# Patient Record
Sex: FEMALE | Race: WHITE | NOT HISPANIC OR LATINO | Employment: FULL TIME | URBAN - METROPOLITAN AREA
[De-identification: names, ages, dates, MRNs, and addresses within clinical notes are randomized per-mention and may not be internally consistent; named-entity substitution may affect disease eponyms.]

---

## 2017-06-09 ENCOUNTER — BCC/SCC (OUTPATIENT)
Dept: URBAN - METROPOLITAN AREA CLINIC 31 | Facility: CLINIC | Age: 62
Setting detail: DERMATOLOGY
End: 2017-06-09

## 2017-06-09 PROCEDURE — 11301 SHAVE SKIN LESION 0.6-1.0 CM: CPT

## 2017-06-09 PROCEDURE — 99214 OFFICE O/P EST MOD 30 MIN: CPT

## 2019-07-03 ENCOUNTER — SKIN CANCER EXAM (OUTPATIENT)
Dept: URBAN - METROPOLITAN AREA CLINIC 31 | Facility: CLINIC | Age: 64
Setting detail: DERMATOLOGY
End: 2019-07-03

## 2019-07-03 DIAGNOSIS — Z09 ENCOUNTER FOR FOLLOW-UP EXAMINATION AFTER COMPLETED TREATMENT FOR CONDITIONS OTHER THAN MALIGNANT NEOPLASM: ICD-10-CM

## 2019-07-03 PROBLEM — L82.1 OTHER SEBORRHEIC KERATOSIS: Status: ACTIVE | Noted: 2019-07-03

## 2019-07-03 PROBLEM — L90.5 SCAR CONDITIONS AND FIBROSIS OF SKIN: Status: RESOLVED | Noted: 2019-07-03

## 2019-07-03 PROBLEM — L57.0 ACTINIC KERATOSIS: Status: RESOLVED | Noted: 2019-07-03

## 2019-07-03 PROBLEM — L82.1 OTHER SEBORRHEIC KERATOSIS: Status: RESOLVED | Noted: 2019-07-03

## 2019-07-03 PROBLEM — L90.5 SCAR CONDITIONS AND FIBROSIS OF SKIN: Status: ACTIVE | Noted: 2019-07-03

## 2019-07-03 PROCEDURE — 17000 DESTRUCT PREMALG LESION: CPT

## 2019-07-03 PROCEDURE — 99214 OFFICE O/P EST MOD 30 MIN: CPT

## 2020-11-25 ENCOUNTER — RX ONLY (RX ONLY)
Age: 65
End: 2020-11-25

## 2020-11-25 ENCOUNTER — SKIN CANCER EXAM (OUTPATIENT)
Dept: URBAN - METROPOLITAN AREA CLINIC 31 | Facility: CLINIC | Age: 65
Setting detail: DERMATOLOGY
End: 2020-11-25

## 2020-11-25 DIAGNOSIS — Z41.9 ENCOUNTER FOR PROCEDURE FOR PURPOSES OTHER THAN REMEDYING HEALTH STATE, UNSPECIFIED: ICD-10-CM

## 2020-11-25 PROBLEM — L57.0 ACTINIC KERATOSIS: Status: RESOLVED | Noted: 2020-11-25

## 2020-11-25 PROBLEM — R21 RASH AND OTHER NONSPECIFIC SKIN ERUPTION: Status: ACTIVE | Noted: 2020-11-25

## 2020-11-25 PROBLEM — R21 RASH AND OTHER NONSPECIFIC SKIN ERUPTION: Status: RESOLVED | Noted: 2020-11-25

## 2020-11-25 PROBLEM — D18.01 HEMANGIOMA OF SKIN AND SUBCUTANEOUS TISSUE: Status: RESOLVED | Noted: 2020-11-25

## 2020-11-25 PROBLEM — D18.01 HEMANGIOMA OF SKIN AND SUBCUTANEOUS TISSUE: Status: ACTIVE | Noted: 2020-11-25

## 2020-11-25 PROCEDURE — 17003 DESTRUCT PREMALG LES 2-14: CPT

## 2020-11-25 PROCEDURE — 99214 OFFICE O/P EST MOD 30 MIN: CPT

## 2020-11-25 PROCEDURE — 17000 DESTRUCT PREMALG LESION: CPT

## 2021-04-06 ENCOUNTER — OFFICE VISIT (OUTPATIENT)
Dept: URBAN - METROPOLITAN AREA CLINIC 96 | Facility: CLINIC | Age: 66
End: 2021-04-06

## 2021-06-15 ENCOUNTER — OFFICE VISIT (OUTPATIENT)
Dept: URBAN - METROPOLITAN AREA CLINIC 96 | Facility: CLINIC | Age: 66
End: 2021-06-15

## 2021-06-16 ENCOUNTER — OFFICE VISIT (OUTPATIENT)
Dept: URBAN - METROPOLITAN AREA CLINIC 96 | Facility: CLINIC | Age: 66
End: 2021-06-16
Payer: COMMERCIAL

## 2021-06-16 ENCOUNTER — WEB ENCOUNTER (OUTPATIENT)
Dept: URBAN - METROPOLITAN AREA CLINIC 96 | Facility: CLINIC | Age: 66
End: 2021-06-16

## 2021-06-16 ENCOUNTER — LAB OUTSIDE AN ENCOUNTER (OUTPATIENT)
Dept: URBAN - METROPOLITAN AREA CLINIC 96 | Facility: CLINIC | Age: 66
End: 2021-06-16

## 2021-06-16 DIAGNOSIS — K76.89 LIVER CYST: ICD-10-CM

## 2021-06-16 DIAGNOSIS — Z86.010 PERSONAL HISTORY OF COLONIC POLYPS: ICD-10-CM

## 2021-06-16 DIAGNOSIS — K21.9 GASTROESOPHAGEAL REFLUX DISEASE, UNSPECIFIED WHETHER ESOPHAGITIS PRESENT: ICD-10-CM

## 2021-06-16 PROCEDURE — 99204 OFFICE O/P NEW MOD 45 MIN: CPT | Performed by: INTERNAL MEDICINE

## 2021-06-16 RX ORDER — DEXLANSOPRAZOLE 60 MG/1
1 CAPSULE CAPSULE, DELAYED RELEASE ORAL ONCE A DAY
Qty: 30 | Refills: 6 | OUTPATIENT
Start: 2021-06-16

## 2021-06-16 NOTE — HPI-TODAY'S VISIT:
64 yo female referred by Dr Kimi Valero for a GI consultation for GERD and a copy of this note will be sent to the referring physician. Patient had not ultrasound done by her primary care physician which revealed multiple liver lesions which were indeterminate but appeared to look like cysts.  A contrast-enhanced MRI was recommended for further evaluation and this was done for epigastric discomfort.  The ultrasound was done by Dr. Jono Sneed on August 26, 2020.  Patient had a colonoscopy done by Dr. Fani Blandon on September 11, 2020 this revealed a rectal polyp that was hyperplastic and removed.  MRI done on September 21, 2020 by Dr. Sneed revealed multiple benign meningiomas and cyst within the liver no other acute abnormality.  Patient had a gastric emptying scan done on December 3, 2020 and this revealed normal gastric emptying.  Patient had a HIDA scan done on December 8, 2020 and this was normal.  EGD done on September 8, 2020 by Dr. Jono Sneed revealed LA grade a esophagitis and a small hiatal hernia.  She also had some gastritis.  Biopsies are not available from that exam. Pt started with omeprazole 40mg 1 a day. Pt saw an ENT due to throat burning and metallic taste in mouth. Pt was Pt was increased to omeprazole BID. Pt was gassy with meds but also is gassy in general. Pt was changed to pepcid qhs only. Pt had in Sept both nausea, with chest discomfort and heartburn. Pt saws Dr De La Cruz and meds changed to pantoprazole in the am and she did this through last fall- this helped heartburn and nausea some but not resolved. Pt had in Nov- bad days and so she was increased to pantoprazole BID. Pt had a f/u in March and she was having good and bad days. Pt stayed on PPI BID until May and she was having mucus and gas so she stopped PPIs. Pt is now off the meds. Pt has not wanted to stay on meds.

## 2021-07-15 ENCOUNTER — WEB ENCOUNTER (OUTPATIENT)
Dept: URBAN - METROPOLITAN AREA CLINIC 96 | Facility: CLINIC | Age: 66
End: 2021-07-15

## 2021-07-16 ENCOUNTER — WEB ENCOUNTER (OUTPATIENT)
Dept: URBAN - METROPOLITAN AREA CLINIC 96 | Facility: CLINIC | Age: 66
End: 2021-07-16

## 2021-07-19 ENCOUNTER — WEB ENCOUNTER (OUTPATIENT)
Dept: URBAN - METROPOLITAN AREA CLINIC 96 | Facility: CLINIC | Age: 66
End: 2021-07-19

## 2021-07-20 ENCOUNTER — TELEPHONE ENCOUNTER (OUTPATIENT)
Dept: URBAN - METROPOLITAN AREA CLINIC 92 | Facility: CLINIC | Age: 66
End: 2021-07-20

## 2021-07-25 ENCOUNTER — WEB ENCOUNTER (OUTPATIENT)
Dept: URBAN - METROPOLITAN AREA CLINIC 96 | Facility: CLINIC | Age: 66
End: 2021-07-25

## 2021-08-05 ENCOUNTER — TELEPHONE ENCOUNTER (OUTPATIENT)
Dept: URBAN - METROPOLITAN AREA CLINIC 96 | Facility: CLINIC | Age: 66
End: 2021-08-05

## 2021-08-09 ENCOUNTER — ERX REFILL RESPONSE (OUTPATIENT)
Dept: URBAN - METROPOLITAN AREA CLINIC 96 | Facility: CLINIC | Age: 66
End: 2021-08-09

## 2021-08-09 RX ORDER — DEXLANSOPRAZOLE 60 MG/1
1 CAPSULE CAPSULE, DELAYED RELEASE ORAL ONCE A DAY
Qty: 30 | Refills: 6 | OUTPATIENT

## 2021-08-09 RX ORDER — DEXLANSOPRAZOLE 60 MG/1
TAKE 1 CAPSULE BY MOUTH EVERY DAY CAPSULE, DELAYED RELEASE ORAL
Qty: 90 CAPSULE | Refills: 3 | OUTPATIENT

## 2021-08-27 ENCOUNTER — WEB ENCOUNTER (OUTPATIENT)
Dept: URBAN - METROPOLITAN AREA CLINIC 96 | Facility: CLINIC | Age: 66
End: 2021-08-27

## 2021-09-03 PROBLEM — 235595009 GASTROESOPHAGEAL REFLUX DISEASE: Status: ACTIVE | Noted: 2021-09-03

## 2021-09-16 ENCOUNTER — CLAIMS CREATED FROM THE CLAIM WINDOW (OUTPATIENT)
Dept: URBAN - METROPOLITAN AREA CLINIC 4 | Facility: CLINIC | Age: 66
End: 2021-09-16
Payer: COMMERCIAL

## 2021-09-16 ENCOUNTER — OFFICE VISIT (OUTPATIENT)
Dept: URBAN - METROPOLITAN AREA SURGERY CENTER 16 | Facility: SURGERY CENTER | Age: 66
End: 2021-09-16
Payer: COMMERCIAL

## 2021-09-16 DIAGNOSIS — K21.9 ACID REFLUX: ICD-10-CM

## 2021-09-16 DIAGNOSIS — K31.89 ACQUIRED DEFORMITY OF DUODENUM: ICD-10-CM

## 2021-09-16 DIAGNOSIS — K21.9 GASTRO-ESOPHAGEAL REFLUX DISEASE WITHOUT ESOPHAGITIS: ICD-10-CM

## 2021-09-16 DIAGNOSIS — K31.89 NONSURGICAL DUMPING SYNDROME: ICD-10-CM

## 2021-09-16 PROCEDURE — 43239 EGD BIOPSY SINGLE/MULTIPLE: CPT | Performed by: INTERNAL MEDICINE

## 2021-09-16 PROCEDURE — G8907 PT DOC NO EVENTS ON DISCHARG: HCPCS | Performed by: INTERNAL MEDICINE

## 2021-09-16 PROCEDURE — 88312 SPECIAL STAINS GROUP 1: CPT | Performed by: PATHOLOGY

## 2021-09-16 PROCEDURE — 88305 TISSUE EXAM BY PATHOLOGIST: CPT | Performed by: PATHOLOGY

## 2021-09-20 ENCOUNTER — OFFICE VISIT (OUTPATIENT)
Dept: URBAN - METROPOLITAN AREA CLINIC 92 | Facility: CLINIC | Age: 66
End: 2021-09-20

## 2021-09-20 RX ORDER — DEXLANSOPRAZOLE 60 MG/1
TAKE 1 CAPSULE BY MOUTH EVERY DAY CAPSULE, DELAYED RELEASE ORAL
Qty: 90 CAPSULE | Refills: 3 | Status: ACTIVE | COMMUNITY

## 2021-09-30 ENCOUNTER — WEB ENCOUNTER (OUTPATIENT)
Dept: URBAN - METROPOLITAN AREA CLINIC 96 | Facility: CLINIC | Age: 66
End: 2021-09-30

## 2021-10-04 ENCOUNTER — WEB ENCOUNTER (OUTPATIENT)
Dept: URBAN - METROPOLITAN AREA CLINIC 96 | Facility: CLINIC | Age: 66
End: 2021-10-04

## 2021-10-06 ENCOUNTER — OFFICE VISIT (OUTPATIENT)
Dept: URBAN - METROPOLITAN AREA TELEHEALTH 2 | Facility: TELEHEALTH | Age: 66
End: 2021-10-06
Payer: COMMERCIAL

## 2021-10-06 DIAGNOSIS — K21.9 GASTROESOPHAGEAL REFLUX DISEASE, UNSPECIFIED WHETHER ESOPHAGITIS PRESENT: ICD-10-CM

## 2021-10-06 DIAGNOSIS — K76.89 LIVER CYST: ICD-10-CM

## 2021-10-06 DIAGNOSIS — D18.03 LIVER HEMANGIOMA: ICD-10-CM

## 2021-10-06 DIAGNOSIS — R74.8 ELEVATED ALKALINE PHOSPHATASE LEVEL: ICD-10-CM

## 2021-10-06 DIAGNOSIS — Z86.010 PERSONAL HISTORY OF COLONIC POLYPS: ICD-10-CM

## 2021-10-06 PROBLEM — 235595009 GASTROESOPHAGEAL REFLUX DISEASE: Status: ACTIVE | Noted: 2021-10-05

## 2021-10-06 PROBLEM — 428283002: Status: ACTIVE | Noted: 2021-06-16

## 2021-10-06 PROBLEM — 85057007: Status: ACTIVE | Noted: 2021-06-16

## 2021-10-06 PROCEDURE — 99205 OFFICE O/P NEW HI 60 MIN: CPT | Performed by: INTERNAL MEDICINE

## 2021-10-06 RX ORDER — DEXLANSOPRAZOLE 60 MG/1
TAKE 1 CAPSULE BY MOUTH EVERY DAY CAPSULE, DELAYED RELEASE ORAL
Qty: 90 CAPSULE | Refills: 3 | Status: ACTIVE | COMMUNITY

## 2021-10-06 RX ORDER — FAMOTIDINE 20 MG/1
1 TABLET AT BEDTIME AS NEEDED TABLET, FILM COATED ORAL ONCE A DAY
Status: ACTIVE | COMMUNITY

## 2021-10-06 RX ORDER — DEXLANSOPRAZOLE 60 MG/1
1 CAPSULE CAPSULE, DELAYED RELEASE ORAL ONCE A DAY
Qty: 30 | Refills: 6 | OUTPATIENT

## 2021-10-06 NOTE — HPI-TODAY'S VISIT:
66 yo female referred by Dr Kimi Vlaero for a GI fu for GERD and stomach issues and a copy of this note will be sent to the referring physician. Patient had not ultrasound done by her primary care physician which revealed multiple liver lesions which were indeterminate but appeared to look like cysts.  A contrast-enhanced MRI was recommended for further evaluation and this was done for epigastric discomfort.  The ultrasound was done by Dr. Jono Sneed on August 26, 2020.  Patient had a colonoscopy done by Dr. Fani Rangel on September 11, 2020 this revealed a rectal polyp that was hyperplastic and removed.  MRI done on September 21, 2020 by Dr. Sneed revealed multiple benign hemangiomas and cyst within the liver no other acute abnormality.  Patient had a gastric emptying scan done on December 3, 2020 and this revealed normal gastric emptying.  Patient had a HIDA scan done on December 8, 2020 and this was normal.  EGD done on September 8, 2020 by Dr. oJno Sneed revealed LA grade a esophagitis and a small hiatal hernia.  She also had some gastritis.  Biopsies are not available from that exam. Pt started with omeprazole 40mg 1 a day. Pt saw an ENT due to throat burning and metallic taste in mouth. Pt was Pt was increased to omeprazole BID. Pt was gassy with meds but also is gassy in general. Pt was changed to pepcid qhs only. Pt had in Sept both nausea, with chest discomfort and heartburn. Pt saws Dr De La Cruz and meds changed to pantoprazole in the am and she did this through last fall- this helped heartburn and nausea some but not resolved. Pt had in Nov- bad days and so she was increased to pantoprazole BID. Pt had a f/u in March and she was having good and bad days. Pt stayed on PPI BID until May and she was having mucus and gas so she stopped PPIs. Patient had an EGD B[facility] on September 16, 2021 on PPI to assess her atypical symptoms of throat clearing and metallic taste in the mouth despite Dexilant.  The exam was fairly normal.  Bravo study showed no acid breakthrough while on PPI.  Biopsies also were fairly unrevealing. Biopsies with reflux and reactive gastropathy no H pylori. Patient has emailed me a few times complaining of persistent nausea as well as some reflux symptoms that come and go despite medications.  She is a bit frustrated that she has ongoing symptoms despite a negative work-up to date. Pt was not on pepcid during the bravo. Pt c/o essentially saliva in mouth in am and throat burning despite dexilant and pepcid. Pt also c/o stomach fullness and burning sometimes on epigastrium. Pt just started FDgard. Pt had bloodwork with alk phosphatase is 123 so 2 points above normal. Pt admits to lots of stress this year, does exercise. Pt does  for a [employer] company. Thinking about retiring at some point soon.

## 2021-10-14 ENCOUNTER — WEB ENCOUNTER (OUTPATIENT)
Dept: URBAN - METROPOLITAN AREA CLINIC 96 | Facility: CLINIC | Age: 66
End: 2021-10-14

## 2021-10-14 ENCOUNTER — TELEPHONE ENCOUNTER (OUTPATIENT)
Dept: URBAN - METROPOLITAN AREA CLINIC 92 | Facility: CLINIC | Age: 66
End: 2021-10-14

## 2021-10-29 ENCOUNTER — FACIAL- NEW (OUTPATIENT)
Dept: URBAN - METROPOLITAN AREA CLINIC 30 | Facility: CLINIC | Age: 66
Setting detail: DERMATOLOGY
End: 2021-10-29

## 2021-10-29 DIAGNOSIS — L57.0 ACTINIC KERATOSIS: ICD-10-CM

## 2021-10-29 PROCEDURE — OTHER FACIAL: OTHER

## 2021-11-12 ENCOUNTER — WEB ENCOUNTER (OUTPATIENT)
Dept: URBAN - METROPOLITAN AREA CLINIC 96 | Facility: CLINIC | Age: 66
End: 2021-11-12

## 2021-11-16 ENCOUNTER — WEB ENCOUNTER (OUTPATIENT)
Dept: URBAN - METROPOLITAN AREA CLINIC 96 | Facility: CLINIC | Age: 66
End: 2021-11-16

## 2021-11-16 ENCOUNTER — TELEPHONE ENCOUNTER (OUTPATIENT)
Dept: URBAN - METROPOLITAN AREA CLINIC 92 | Facility: CLINIC | Age: 66
End: 2021-11-16

## 2021-11-23 ENCOUNTER — WEB ENCOUNTER (OUTPATIENT)
Dept: URBAN - METROPOLITAN AREA CLINIC 96 | Facility: CLINIC | Age: 66
End: 2021-11-23

## 2021-12-04 ENCOUNTER — DASHBOARD ENCOUNTERS (OUTPATIENT)
Age: 66
End: 2021-12-04

## 2021-12-06 ENCOUNTER — OFFICE VISIT (OUTPATIENT)
Dept: URBAN - METROPOLITAN AREA TELEHEALTH 2 | Facility: TELEHEALTH | Age: 66
End: 2021-12-06
Payer: COMMERCIAL

## 2021-12-06 ENCOUNTER — TELEPHONE ENCOUNTER (OUTPATIENT)
Dept: URBAN - METROPOLITAN AREA CLINIC 98 | Facility: CLINIC | Age: 66
End: 2021-12-06

## 2021-12-06 DIAGNOSIS — K76.9 LIVER LESION: ICD-10-CM

## 2021-12-06 DIAGNOSIS — R74.8 ELEVATED ALKALINE PHOSPHATASE LEVEL: ICD-10-CM

## 2021-12-06 PROBLEM — 300331000: Status: ACTIVE | Noted: 2021-12-04

## 2021-12-06 PROCEDURE — 99214 OFFICE O/P EST MOD 30 MIN: CPT | Performed by: INTERNAL MEDICINE

## 2021-12-06 RX ORDER — DEXLANSOPRAZOLE 60 MG/1
1 CAPSULE CAPSULE, DELAYED RELEASE ORAL ONCE A DAY
Qty: 30 | Refills: 6 | Status: ACTIVE | COMMUNITY

## 2021-12-06 RX ORDER — FAMOTIDINE 20 MG/1
1 TABLET AT BEDTIME AS NEEDED TABLET, FILM COATED ORAL ONCE A DAY
Status: ACTIVE | COMMUNITY

## 2021-12-06 RX ORDER — DEXLANSOPRAZOLE 60 MG/1
TAKE 1 CAPSULE BY MOUTH EVERY DAY CAPSULE, DELAYED RELEASE ORAL
Qty: 90 CAPSULE | Refills: 3 | Status: ACTIVE | COMMUNITY

## 2021-12-06 NOTE — HPI-TODAY'S VISIT:
Here on kind referral from Dr Romero Florez for elevated liver testing and abnormal liver imaging.   Had reflux at first, then July 2020 - went for GB US for reflux symptoms.  had MRI:  [place] [facility] 2020 : R lobe hemangioma largest 3.4 cm; segment 6 hemangioma 4cm, 1.1 cm hemangioma seg 4a, 0.6cm hemangioma in seg 3; multiple hepatic cysts largest 1.8cm.  non cirrhotic liver morphology, patent hepatic veins, no splenomegaly This year, did have high ALP on physical.   labs 11/11 : cr 0.61 na 133 alb 4.3 tbili 0.2 alp 127 ast 28 alt 21 ggt 15 no nausea, but mouth burns.  Dexilant works well.

## 2021-12-11 LAB
A/G RATIO: 1.5
AFP, SERUM, TUMOR MARKER: 2.9
ALBUMIN: 4.1
ALKALINE PHOSPHATASE: 142
ALT (SGPT): 20
AST (SGOT): 26
BASO (ABSOLUTE): 0
BASOS: 1
BILIRUBIN, TOTAL: <0.2
BONE FRACTION:: 32
BUN/CREATININE RATIO: 13
BUN: 7
CALCIUM: 9.2
CARBON DIOXIDE, TOTAL: 25
CHLORIDE: 99
CREATININE: 0.55
EGFR IF AFRICN AM: 115
EGFR IF NONAFRICN AM: 99
EOS (ABSOLUTE): 0.1
EOS: 1
GGT: 15
GLOBULIN, TOTAL: 2.8
GLUCOSE: 115
HBSAG SCREEN: NEGATIVE
HCV AB: 0.4
HEMATOCRIT: 41.6
HEMATOLOGY COMMENTS:: (no result)
HEMOGLOBIN: 13.9
HEP A AB, TOTAL: NEGATIVE
HEP B CORE AB, TOT: NEGATIVE
HEPATITIS B SURF AB QUANT: <3.1
IMMATURE CELLS: (no result)
IMMATURE GRANS (ABS): 0
IMMATURE GRANULOCYTES: 0
IMMUNOGLOBULIN A, QN, SERUM: 368
IMMUNOGLOBULIN G, QN, SERUM: 1093
IMMUNOGLOBULIN M, QN, SERUM: 88
INTERPRETATION:: (no result)
INTESTINAL FRAC.:: 2
LIVER FRACTION:: 66
LYMPHS (ABSOLUTE): 1.6
LYMPHS: 24
MCH: 29.2
MCHC: 33.4
MCV: 87
MITOCHONDRIAL (M2) ANTIBODY: <20
MONOCYTES(ABSOLUTE): 0.6
MONOCYTES: 8
NEUTROPHILS (ABSOLUTE): 4.3
NEUTROPHILS: 66
NRBC: (no result)
PLATELETS: 328
POTASSIUM: 4.2
PROTEIN, TOTAL: 6.9
RBC: 4.76
RDW: 12
SODIUM: 137
WBC: 6.6

## 2021-12-13 ENCOUNTER — TELEPHONE ENCOUNTER (OUTPATIENT)
Dept: URBAN - METROPOLITAN AREA CLINIC 98 | Facility: CLINIC | Age: 66
End: 2021-12-13

## 2021-12-13 ENCOUNTER — WEB ENCOUNTER (OUTPATIENT)
Dept: URBAN - METROPOLITAN AREA CLINIC 86 | Facility: CLINIC | Age: 66
End: 2021-12-13

## 2021-12-27 ENCOUNTER — TELEPHONE ENCOUNTER (OUTPATIENT)
Dept: URBAN - METROPOLITAN AREA CLINIC 98 | Facility: CLINIC | Age: 66
End: 2021-12-27

## 2021-12-29 ENCOUNTER — WEB ENCOUNTER (OUTPATIENT)
Dept: URBAN - METROPOLITAN AREA CLINIC 86 | Facility: CLINIC | Age: 66
End: 2021-12-29

## 2021-12-30 ENCOUNTER — WEB ENCOUNTER (OUTPATIENT)
Dept: URBAN - METROPOLITAN AREA CLINIC 86 | Facility: CLINIC | Age: 66
End: 2021-12-30

## 2022-01-10 ENCOUNTER — WEB ENCOUNTER (OUTPATIENT)
Dept: URBAN - METROPOLITAN AREA CLINIC 96 | Facility: CLINIC | Age: 67
End: 2022-01-10

## 2022-01-10 RX ORDER — DEXLANSOPRAZOLE 60 MG/1
1 CAPSULE CAPSULE, DELAYED RELEASE ORAL ONCE A DAY
Qty: 90 CAPSULE | Refills: 3 | OUTPATIENT
Start: 2022-01-11

## 2022-01-19 ENCOUNTER — ERX REFILL RESPONSE (OUTPATIENT)
Dept: URBAN - METROPOLITAN AREA CLINIC 96 | Facility: CLINIC | Age: 67
End: 2022-01-19

## 2022-01-19 RX ORDER — DEXLANSOPRAZOLE 60 MG/1
TAKE 1 CAPSULE BY MOUTH EVERY DAY CAPSULE, DELAYED RELEASE ORAL
Qty: 90 CAPSULE | Refills: 3 | OUTPATIENT

## 2022-01-19 RX ORDER — DEXLANSOPRAZOLE 60 MG/1
TAKE 1 CAPSULE BY MOUTH EVERY DAY CAPSULE, DELAYED RELEASE ORAL
Qty: 90 CAPSULE | Refills: 4 | OUTPATIENT

## 2022-03-16 ENCOUNTER — WEB ENCOUNTER (OUTPATIENT)
Dept: URBAN - METROPOLITAN AREA CLINIC 96 | Facility: CLINIC | Age: 67
End: 2022-03-16

## 2022-05-13 ENCOUNTER — WEB ENCOUNTER (OUTPATIENT)
Dept: URBAN - METROPOLITAN AREA CLINIC 96 | Facility: CLINIC | Age: 67
End: 2022-05-13

## 2022-08-22 ENCOUNTER — APPOINTMENT (OUTPATIENT)
Dept: URBAN - METROPOLITAN AREA CLINIC 207 | Age: 67
Setting detail: DERMATOLOGY
End: 2022-08-22

## 2022-08-22 DIAGNOSIS — L65.9 NONSCARRING HAIR LOSS, UNSPECIFIED: ICD-10-CM

## 2022-08-22 DIAGNOSIS — L60.0 INGROWING NAIL: ICD-10-CM

## 2022-08-22 DIAGNOSIS — L58.9 RADIODERMATITIS, UNSPECIFIED: ICD-10-CM

## 2022-08-22 PROCEDURE — OTHER COUNSELING: OTHER

## 2022-08-22 PROCEDURE — 99214 OFFICE O/P EST MOD 30 MIN: CPT

## 2022-08-22 PROCEDURE — OTHER PRESCRIPTION MEDICATION MANAGEMENT: OTHER

## 2022-08-22 PROCEDURE — OTHER PRESCRIPTION: OTHER

## 2022-08-22 RX ORDER — HYDROCORTISONE 25 MG/G
CREAM TOPICAL
Qty: 30 | Refills: 0 | Status: ERX | COMMUNITY
Start: 2022-08-22

## 2022-08-22 ASSESSMENT — LOCATION ZONE DERM
LOCATION ZONE: TRUNK
LOCATION ZONE: TOE
LOCATION ZONE: SCALP

## 2022-08-22 ASSESSMENT — LOCATION SIMPLE DESCRIPTION DERM
LOCATION SIMPLE: LEFT GREAT TOE
LOCATION SIMPLE: SCALP
LOCATION SIMPLE: RIGHT UPPER BACK

## 2022-08-22 ASSESSMENT — LOCATION DETAILED DESCRIPTION DERM
LOCATION DETAILED: RIGHT MEDIAL UPPER BACK
LOCATION DETAILED: LEFT SUPERIOR PARIETAL SCALP
LOCATION DETAILED: LEFT DORSAL GREAT TOE

## 2022-08-22 NOTE — PROCEDURE: PRESCRIPTION MEDICATION MANAGEMENT
Detail Level: Zone
Render In Strict Bullet Format?: No
Plan: Compound prescription sent to [facility] pharmacy

## 2022-08-22 NOTE — HPI: RASH
What Type Of Note Output Would You Prefer (Optional)?: Bullet Format
Is This A New Presentation, Or A Follow-Up?: Rash
Additional History: Patient states rash started after MRI on Friday

## 2022-08-22 NOTE — HPI: SKIN IRRITATION
Additional History: Patient complains of blisters on Left great toenail for 3 weeks off and on. Patient states side effects from oral cancer drug. Patient soaks in baking soda and warm water twice a day and applies neosporin ointment.

## 2022-08-22 NOTE — HPI: HAIR LOSS
How Did The Hair Loss Occur?: sudden in onset
How Severe Is Your Hair Loss?: moderate
Additional History: Recent lung  cancer diagnosis 1/2022

## 2022-08-23 ENCOUNTER — WEB ENCOUNTER (OUTPATIENT)
Dept: URBAN - METROPOLITAN AREA CLINIC 96 | Facility: CLINIC | Age: 67
End: 2022-08-23

## 2022-08-23 ENCOUNTER — TELEPHONE ENCOUNTER (OUTPATIENT)
Dept: URBAN - METROPOLITAN AREA SURGERY CENTER 30 | Facility: SURGERY CENTER | Age: 67
End: 2022-08-23

## 2022-08-23 RX ORDER — DEXLANSOPRAZOLE 60 MG/1
TAKE 1 CAPSULE BY MOUTH EVERY DAY CAPSULE, DELAYED RELEASE ORAL ONCE A DAY
Qty: 90 | Refills: 3

## 2022-08-23 RX ORDER — PANTOPRAZOLE SODIUM 40 MG/1
1 TABLET TABLET, DELAYED RELEASE ORAL ONCE A DAY
Qty: 90 | Refills: 3 | OUTPATIENT
Start: 2022-08-23

## 2022-08-24 ENCOUNTER — TELEPHONE ENCOUNTER (OUTPATIENT)
Dept: URBAN - METROPOLITAN AREA CLINIC 96 | Facility: CLINIC | Age: 67
End: 2022-08-24

## 2022-09-01 ENCOUNTER — WEB ENCOUNTER (OUTPATIENT)
Dept: URBAN - METROPOLITAN AREA CLINIC 96 | Facility: CLINIC | Age: 67
End: 2022-09-01

## 2022-09-13 ENCOUNTER — TELEPHONE ENCOUNTER (OUTPATIENT)
Dept: URBAN - METROPOLITAN AREA CLINIC 92 | Facility: CLINIC | Age: 67
End: 2022-09-13

## 2022-09-13 ENCOUNTER — WEB ENCOUNTER (OUTPATIENT)
Dept: URBAN - METROPOLITAN AREA CLINIC 96 | Facility: CLINIC | Age: 67
End: 2022-09-13

## 2022-09-27 ENCOUNTER — WEB ENCOUNTER (OUTPATIENT)
Dept: URBAN - METROPOLITAN AREA CLINIC 96 | Facility: CLINIC | Age: 67
End: 2022-09-27

## 2023-07-14 ENCOUNTER — ERX REFILL RESPONSE (OUTPATIENT)
Dept: URBAN - METROPOLITAN AREA CLINIC 96 | Facility: CLINIC | Age: 68
End: 2023-07-14

## 2023-07-14 RX ORDER — DEXLANSOPRAZOLE 60 MG/1
TAKE 1 CAPSULE BY MOUTH EVERY DAY FOR 90 DAYS CAPSULE, DELAYED RELEASE ORAL
Qty: 90 CAPSULE | Refills: 3 | OUTPATIENT

## 2023-07-14 RX ORDER — DEXLANSOPRAZOLE 60 MG/1
TAKE 1 CAPSULE BY MOUTH EVERY DAY CAPSULE, DELAYED RELEASE ORAL ONCE A DAY
Qty: 90 | Refills: 3 | OUTPATIENT

## 2024-07-08 ENCOUNTER — WEB ENCOUNTER (OUTPATIENT)
Dept: URBAN - METROPOLITAN AREA CLINIC 98 | Facility: CLINIC | Age: 69
End: 2024-07-08

## 2024-07-08 RX ORDER — DEXLANSOPRAZOLE 60 MG/1
1 CAPSULE CAPSULE, DELAYED RELEASE PELLETS ORAL ONCE A DAY
Qty: 90 CAPSULE | Refills: 0 | OUTPATIENT
Start: 2024-07-09

## 2024-07-09 ENCOUNTER — APPOINTMENT (OUTPATIENT)
Dept: URBAN - METROPOLITAN AREA CLINIC 206 | Age: 69
Setting detail: DERMATOLOGY
End: 2024-07-13

## 2024-07-09 DIAGNOSIS — L57.0 ACTINIC KERATOSIS: ICD-10-CM

## 2024-07-09 PROCEDURE — 17000 DESTRUCT PREMALG LESION: CPT

## 2024-07-09 PROCEDURE — OTHER LIQUID NITROGEN: OTHER

## 2024-07-09 PROCEDURE — OTHER MIPS QUALITY: OTHER

## 2024-07-09 PROCEDURE — 17003 DESTRUCT PREMALG LES 2-14: CPT

## 2024-07-09 ASSESSMENT — LOCATION ZONE DERM: LOCATION ZONE: NOSE

## 2024-07-09 ASSESSMENT — LOCATION DETAILED DESCRIPTION DERM
LOCATION DETAILED: NASAL SUPRATIP
LOCATION DETAILED: NASAL DORSUM

## 2024-07-09 ASSESSMENT — LOCATION SIMPLE DESCRIPTION DERM: LOCATION SIMPLE: NOSE

## 2024-07-09 NOTE — PROCEDURE: LIQUID NITROGEN
Render Note In Bullet Format When Appropriate: No
Detail Level: Detailed
Number Of Freeze-Thaw Cycles: 2 freeze-thaw cycles
Duration Of Freeze Thaw-Cycle (Seconds): 5
Render Post-Care Instructions In Note?: yes
Post-Care Instructions: I reviewed with the patient in detail post-care instructions. Patient is to wear sunprotection, and avoid picking at any of the treated lesions. Pt may apply Vaseline to crusted or scabbing areas.
Consent: The patient's consent was obtained including but not limited to risks of crusting, scabbing, blistering, scarring, darker or lighter pigmentary change, recurrence, incomplete removal and infection.

## 2024-07-09 NOTE — HPI: SKIN LESION
How Severe Is Your Skin Lesion?: moderate
Is This A New Presentation, Or A Follow-Up?: Skin Lesion
Additional History: Going through chemo and radiation

## 2024-07-22 ENCOUNTER — LAB OUTSIDE AN ENCOUNTER (OUTPATIENT)
Dept: URBAN - METROPOLITAN AREA TELEHEALTH 2 | Facility: TELEHEALTH | Age: 69
End: 2024-07-22

## 2024-07-22 PROBLEM — 428878000: Status: ACTIVE | Noted: 2024-07-22

## 2024-07-24 ENCOUNTER — OFFICE VISIT (OUTPATIENT)
Dept: URBAN - METROPOLITAN AREA TELEHEALTH 2 | Facility: TELEHEALTH | Age: 69
End: 2024-07-24
Payer: COMMERCIAL

## 2024-07-24 ENCOUNTER — WEB ENCOUNTER (OUTPATIENT)
Dept: URBAN - METROPOLITAN AREA CLINIC 98 | Facility: CLINIC | Age: 69
End: 2024-07-24

## 2024-07-24 DIAGNOSIS — K21.9 GASTROESOPHAGEAL REFLUX DISEASE, UNSPECIFIED WHETHER ESOPHAGITIS PRESENT: ICD-10-CM

## 2024-07-24 DIAGNOSIS — K76.89 LIVER CYST: ICD-10-CM

## 2024-07-24 PROBLEM — 93880001: Status: ACTIVE | Noted: 2024-07-24

## 2024-07-24 PROCEDURE — 99214 OFFICE O/P EST MOD 30 MIN: CPT | Performed by: INTERNAL MEDICINE

## 2024-07-24 PROCEDURE — 99204 OFFICE O/P NEW MOD 45 MIN: CPT | Performed by: INTERNAL MEDICINE

## 2024-07-24 RX ORDER — DEXLANSOPRAZOLE 60 MG/1
1 CAPSULE CAPSULE, DELAYED RELEASE PELLETS ORAL ONCE A DAY
Qty: 90 CAPSULE | Refills: 0 | Status: ACTIVE | COMMUNITY
Start: 2024-07-09

## 2024-07-24 RX ORDER — DEXLANSOPRAZOLE 60 MG/1
1 CAPSULE CAPSULE, DELAYED RELEASE ORAL ONCE A DAY
Qty: 30 | Refills: 6 | OUTPATIENT

## 2024-07-24 RX ORDER — DEXLANSOPRAZOLE 60 MG/1
1 CAPSULE CAPSULE, DELAYED RELEASE PELLETS ORAL ONCE A DAY
Qty: 90 CAPSULE | Refills: 3 | OUTPATIENT
Start: 2024-07-24

## 2024-07-24 NOTE — HPI-TODAY'S VISIT:
67 yo female referred by Dr Kimi Valero for GERD and a copy of this note will be sent to the referring physician. Pt had been seeing Dr Jono Sneed for gerd and had known hx of liver hemangiomas. I rec her to see dr Polanco for liver lesions and elev alk phos. Patient had an EGD Bravo on September 16, 2021 on PPI to assess her atypical symptoms of throat clearing and metallic taste in the mouth despite Dexilant.  The exam was fairly normal.  Bravo study showed no acid breakthrough while on PPI.  Biopsies also were fairly unrevealing. Pt had been dx with lung ca while seeing us and has been receiving therapy for this. Recently reached out to me on portal to re-establish care for gerd.  Pt had biotherapy meds due to fact that lung ca had mets.  Pt is back on chemo again. Scan done showed stable disease (but still metastatic). Pt has Y90 radiation.  Pt is taking dexilant 60 mg daily- it works great. If she misses she has heartburn. Has breakthrough only when has to take steroids.  No dyshagia or vomiting.  50% of the time- chemo is awful and the other times she is ok and tries to travel. Pt has diarrhea related to chemo but ok.

## 2024-10-03 ENCOUNTER — APPOINTMENT (OUTPATIENT)
Dept: URBAN - METROPOLITAN AREA CLINIC 206 | Age: 69
Setting detail: DERMATOLOGY
End: 2024-10-03

## 2024-10-03 DIAGNOSIS — L57.0 ACTINIC KERATOSIS: ICD-10-CM

## 2024-10-03 PROCEDURE — 17003 DESTRUCT PREMALG LES 2-14: CPT

## 2024-10-03 PROCEDURE — OTHER LIQUID NITROGEN: OTHER

## 2024-10-03 PROCEDURE — 17000 DESTRUCT PREMALG LESION: CPT

## 2024-10-03 PROCEDURE — OTHER MIPS QUALITY: OTHER

## 2024-10-03 ASSESSMENT — LOCATION DETAILED DESCRIPTION DERM
LOCATION DETAILED: NASAL SUPRATIP
LOCATION DETAILED: NASAL DORSUM

## 2024-10-03 ASSESSMENT — LOCATION ZONE DERM: LOCATION ZONE: NOSE

## 2024-10-03 ASSESSMENT — LOCATION SIMPLE DESCRIPTION DERM: LOCATION SIMPLE: NOSE
